# Patient Record
Sex: MALE | Race: WHITE
[De-identification: names, ages, dates, MRNs, and addresses within clinical notes are randomized per-mention and may not be internally consistent; named-entity substitution may affect disease eponyms.]

---

## 2019-03-15 ENCOUNTER — HOSPITAL ENCOUNTER (EMERGENCY)
Dept: HOSPITAL 84 - D.ER | Age: 79
Discharge: HOME | End: 2019-03-15
Payer: MEDICARE

## 2019-03-15 VITALS
BODY MASS INDEX: 24.97 KG/M2 | WEIGHT: 188.4 LBS | HEIGHT: 73 IN | HEIGHT: 73 IN | WEIGHT: 188.4 LBS | BODY MASS INDEX: 24.97 KG/M2

## 2019-03-15 VITALS — SYSTOLIC BLOOD PRESSURE: 135 MMHG | DIASTOLIC BLOOD PRESSURE: 72 MMHG

## 2019-03-15 DIAGNOSIS — B02.9: Primary | ICD-10-CM

## 2019-03-15 DIAGNOSIS — B02.8: ICD-10-CM

## 2019-10-09 ENCOUNTER — HOSPITAL ENCOUNTER (INPATIENT)
Dept: HOSPITAL 84 - D.ER | Age: 79
LOS: 1 days | Discharge: HOME | DRG: 392 | End: 2019-10-10
Attending: INTERNAL MEDICINE | Admitting: INTERNAL MEDICINE
Payer: MEDICARE

## 2019-10-09 VITALS — DIASTOLIC BLOOD PRESSURE: 74 MMHG | SYSTOLIC BLOOD PRESSURE: 126 MMHG

## 2019-10-09 VITALS
BODY MASS INDEX: 26.44 KG/M2 | HEIGHT: 73 IN | BODY MASS INDEX: 26.44 KG/M2 | WEIGHT: 199.52 LBS | BODY MASS INDEX: 26.44 KG/M2 | WEIGHT: 199.52 LBS | HEIGHT: 73 IN

## 2019-10-09 VITALS — DIASTOLIC BLOOD PRESSURE: 97 MMHG | SYSTOLIC BLOOD PRESSURE: 171 MMHG

## 2019-10-09 VITALS — SYSTOLIC BLOOD PRESSURE: 150 MMHG | DIASTOLIC BLOOD PRESSURE: 72 MMHG

## 2019-10-09 VITALS — DIASTOLIC BLOOD PRESSURE: 79 MMHG | SYSTOLIC BLOOD PRESSURE: 166 MMHG

## 2019-10-09 VITALS — SYSTOLIC BLOOD PRESSURE: 124 MMHG | DIASTOLIC BLOOD PRESSURE: 69 MMHG

## 2019-10-09 VITALS — SYSTOLIC BLOOD PRESSURE: 120 MMHG | DIASTOLIC BLOOD PRESSURE: 81 MMHG

## 2019-10-09 VITALS — DIASTOLIC BLOOD PRESSURE: 77 MMHG | SYSTOLIC BLOOD PRESSURE: 132 MMHG

## 2019-10-09 DIAGNOSIS — I10: ICD-10-CM

## 2019-10-09 DIAGNOSIS — K21.0: Primary | ICD-10-CM

## 2019-10-09 DIAGNOSIS — R07.89: ICD-10-CM

## 2019-10-09 DIAGNOSIS — Z86.718: ICD-10-CM

## 2019-10-09 DIAGNOSIS — Z87.891: ICD-10-CM

## 2019-10-09 DIAGNOSIS — Z79.01: ICD-10-CM

## 2019-10-09 DIAGNOSIS — E78.5: ICD-10-CM

## 2019-10-09 DIAGNOSIS — E83.42: ICD-10-CM

## 2019-10-09 DIAGNOSIS — E11.9: ICD-10-CM

## 2019-10-09 DIAGNOSIS — N17.9: ICD-10-CM

## 2019-10-09 DIAGNOSIS — I48.91: ICD-10-CM

## 2019-10-09 LAB
ALBUMIN SERPL-MCNC: 3.5 G/DL (ref 3.4–5)
ALP SERPL-CCNC: 102 U/L (ref 46–116)
ALT SERPL-CCNC: 28 U/L (ref 10–68)
ANION GAP SERPL CALC-SCNC: 9.9 MMOL/L (ref 8–16)
APPEARANCE UR: CLEAR
APTT BLD: 35.6 SECONDS (ref 22.8–39.4)
BASOPHILS NFR BLD AUTO: 0.4 % (ref 0–2)
BILIRUB SERPL-MCNC: 1.46 MG/DL (ref 0.2–1.3)
BILIRUB SERPL-MCNC: NEGATIVE MG/DL
BUN SERPL-MCNC: 22 MG/DL (ref 7–18)
CALCIUM SERPL-MCNC: 8.6 MG/DL (ref 8.5–10.1)
CHLORIDE SERPL-SCNC: 105 MMOL/L (ref 98–107)
CK MB SERPL-MCNC: 2.5 U/L (ref 0–3.6)
CK MB SERPL-MCNC: 3.1 U/L (ref 0–3.6)
CK MB SERPL-MCNC: 3.3 U/L (ref 0–3.6)
CK MB SERPL-MCNC: 3.6 U/L (ref 0–3.6)
CK SERPL-CCNC: 107 UL (ref 21–232)
CK SERPL-CCNC: 112 UL (ref 21–232)
CK SERPL-CCNC: 131 UL (ref 21–232)
CK SERPL-CCNC: 92 UL (ref 21–232)
CO2 SERPL-SCNC: 31.3 MMOL/L (ref 21–32)
COLOR UR: YELLOW
CREAT SERPL-MCNC: 1.4 MG/DL (ref 0.6–1.3)
D DIMER PPP FEU-MCNC: < 0.27 UG/MLFEU (ref 0.2–0.54)
EOSINOPHIL NFR BLD: 3.1 % (ref 0–7)
ERYTHROCYTE [DISTWIDTH] IN BLOOD BY AUTOMATED COUNT: 14.8 % (ref 11.5–14.5)
GLOBULIN SER-MCNC: 3.2 G/L
GLUCOSE SERPL-MCNC: 102 MG/DL (ref 74–106)
GLUCOSE SERPL-MCNC: NEGATIVE MG/DL
HCT VFR BLD CALC: 43.4 % (ref 42–54)
HGB BLD-MCNC: 14.9 G/DL (ref 13.5–17.5)
IMM GRANULOCYTES NFR BLD: 0 % (ref 0–5)
INR PPP: 2.29 (ref 0.85–1.17)
KETONES UR STRIP-MCNC: (no result) MG/DL
LYMPHOCYTES NFR BLD AUTO: 33.8 % (ref 15–50)
MAGNESIUM SERPL-MCNC: 1.7 MG/DL (ref 1.8–2.4)
MCH RBC QN AUTO: 30.7 PG (ref 26–34)
MCHC RBC AUTO-ENTMCNC: 34.3 G/DL (ref 31–37)
MCV RBC: 89.5 FL (ref 80–100)
MONOCYTES NFR BLD: 7.7 % (ref 2–11)
NEUTROPHILS NFR BLD AUTO: 55 % (ref 40–80)
NITRITE UR-MCNC: NEGATIVE MG/ML
OSMOLALITY SERPL CALC.SUM OF ELEC: 285 MOSM/KG (ref 275–300)
PH UR STRIP: 5 [PH] (ref 5–6)
PLATELET # BLD: 144 10X3/UL (ref 130–400)
PMV BLD AUTO: 9.8 FL (ref 7.4–10.4)
POTASSIUM SERPL-SCNC: 4.2 MMOL/L (ref 3.5–5.1)
PROT SERPL-MCNC: 6.7 G/DL (ref 6.4–8.2)
PROT UR-MCNC: NEGATIVE MG/DL
PROTHROMBIN TIME: 24.5 SECONDS (ref 11.6–15)
RBC # BLD AUTO: 4.85 10X6/UL (ref 4.2–6.1)
SODIUM SERPL-SCNC: 142 MMOL/L (ref 136–145)
SP GR UR STRIP: 1.01 (ref 1–1.02)
TROPONIN I SERPL-MCNC: < 0.017 NG/ML (ref 0–0.06)
UROBILINOGEN UR-MCNC: NORMAL MG/DL
WBC # BLD AUTO: 5.1 10X3/UL (ref 4.8–10.8)

## 2019-10-09 NOTE — NUR
PATIENT IS RESTING AND WATCHING TV. FAMILY IS THE ROOM. PATIENT DENIES ANY
PAIN OR CONCERNS AT THIS TIME. BED IN THE LOWEST POSITION AND CALL LIGHT IN
REACH.

## 2019-10-09 NOTE — NUR
RECIEVED REPORT FROM COLUMBA RILEY IN ER. ARRIVED TO FLOOR ON STRETCHER.
TRANSFERED SELF TO BED. ALERT AND ORIENTED. SPOUSE AT BEDSIDE. ANSWERS ALL
QUESTIONS APPROPRIATLY. OPEN AREA TO LEFT FOOT NEAR HEEL. STATED THAT IS AN
EFFECT OF AGENT ORANGE AND [facility] TAK[facility] IT. DENIES ANY CP AT THIS
TIME.

## 2019-10-10 VITALS — DIASTOLIC BLOOD PRESSURE: 68 MMHG | SYSTOLIC BLOOD PRESSURE: 130 MMHG

## 2019-10-10 VITALS — DIASTOLIC BLOOD PRESSURE: 66 MMHG | SYSTOLIC BLOOD PRESSURE: 116 MMHG

## 2019-10-10 VITALS — DIASTOLIC BLOOD PRESSURE: 68 MMHG | SYSTOLIC BLOOD PRESSURE: 125 MMHG

## 2019-10-10 VITALS — DIASTOLIC BLOOD PRESSURE: 80 MMHG | SYSTOLIC BLOOD PRESSURE: 134 MMHG

## 2019-10-10 LAB
ANION GAP SERPL CALC-SCNC: 8.6 MMOL/L (ref 8–16)
BASOPHILS NFR BLD AUTO: 0.2 % (ref 0–2)
BUN SERPL-MCNC: 19 MG/DL (ref 7–18)
CALCIUM SERPL-MCNC: 8.3 MG/DL (ref 8.5–10.1)
CHLORIDE SERPL-SCNC: 107 MMOL/L (ref 98–107)
CO2 SERPL-SCNC: 32.8 MMOL/L (ref 21–32)
CREAT SERPL-MCNC: 1.4 MG/DL (ref 0.6–1.3)
EOSINOPHIL NFR BLD: 3.5 % (ref 0–7)
ERYTHROCYTE [DISTWIDTH] IN BLOOD BY AUTOMATED COUNT: 15.1 % (ref 11.5–14.5)
GLUCOSE SERPL-MCNC: 115 MG/DL (ref 74–106)
HCT VFR BLD CALC: 41.4 % (ref 42–54)
HGB BLD-MCNC: 13.8 G/DL (ref 13.5–17.5)
IMM GRANULOCYTES NFR BLD: 0.2 % (ref 0–5)
INR PPP: 1.85 (ref 0.85–1.17)
LYMPHOCYTES NFR BLD AUTO: 28.1 % (ref 15–50)
MCH RBC QN AUTO: 30 PG (ref 26–34)
MCHC RBC AUTO-ENTMCNC: 33.3 G/DL (ref 31–37)
MCV RBC: 90 FL (ref 80–100)
MONOCYTES NFR BLD: 10.4 % (ref 2–11)
NEUTROPHILS NFR BLD AUTO: 57.6 % (ref 40–80)
OSMOLALITY SERPL CALC.SUM OF ELEC: 289 MOSM/KG (ref 275–300)
PLATELET # BLD: 142 10X3/UL (ref 130–400)
PMV BLD AUTO: 10.2 FL (ref 7.4–10.4)
POTASSIUM SERPL-SCNC: 4.4 MMOL/L (ref 3.5–5.1)
PROTHROMBIN TIME: 20.7 SECONDS (ref 11.6–15)
RBC # BLD AUTO: 4.6 10X6/UL (ref 4.2–6.1)
SODIUM SERPL-SCNC: 144 MMOL/L (ref 136–145)
WBC # BLD AUTO: 4.2 10X3/UL (ref 4.8–10.8)

## 2019-10-10 NOTE — NUR
AWAKE AND ALERT. TELEMERTY SHOWS SR 64. UP AB JAMMIE. SL TO LEFT AC. DENIES ANY
NEEDS. SR UP WITH CALL LIGHT IN REACH. WILL MONITOR

## 2019-10-10 NOTE — NUR
WHEN ASKED IF PATIENT WANTED A FLU SHOT BEFORE DISCHARGE, HE REPLIED, "I WILL
FOLLOW UP WITH [facility] FROM [facility] ON THIS ".

## 2019-10-10 NOTE — MORECARE
CASE MANAGEMENT DISCHARGE SUMMARY
 
 
PATIENT: SKYE ESPARZA                 UNIT: [id number]
ACCOUNT#: [id number]                       ADM DATE: 10/09/19
AGE: 79     : 36  SEX: M            ROOM/BED: D.2390    
AUTHOR: IVETTE,DOC                             PHYSICIAN:                               
 
REFERRING PHYSICIAN: FRANCISCO VALENZUELA MD               
DATE OF SERVICE: 10/10/19
Discharge Plan
 
 
Patient Name: SKYE ESPARZA
Facility: [facility]
Encounter #: [id number]
Medical Record #: [id number]
: 1940
Planned Disposition: Home
Anticipated Discharge Date: 10/10/19
 
Discharge Date: 10/09/2019
Expected LOS: 1
Initial Reviewer: MITCHELL
Initial Review Date: 10/10/2019
Generated: 10/10/19   5:15 pm 
Comments
 
DCP- Discharge Planning
 
Updated by [email or web address]: Bryce Mann on 10/10/19   3:12 pm CT
Patient Name: SKYE ESPARZA                                     
Admission Status: ER   
Accout number: [id number]                              
Admission Date: 10-   
: 1940                                                        
Admission Diagnosis:   
Attending: DELMAR PURVIS                                                
Current LOS:  1   
  
Anticipated DC Date: 10-   
Planned Disposition: Home   
Primary Insurance: MEDICARE A & B   
  
  
Discharge Planning Comments:   
CM MET WITH PT IN ROOM TO DISCUSS DISCHARGE PLANNING AND NEEDS. SKYE ESPARZA provided verbal consent to discuss current and ongoing needs 
with/in the presence of: SPOUSE, JORDIN.  PT REPORTS LIVING AT HOME 
INDEPENDENTLY WITH SPOUSE. PT HAS ROLLING WALKER AND CANE.  [facility]  PT HAS NO OUTSIDE SERVICES ASSISTING IN THE HOME. CM 
DISCUSSED AVAILABILITY OF HOME HEALTH, REHAB SERVICES AND MEDICAL EQUIPMENT. 
PT DENIES DISCHARGE NEEDS, REPORTS HIS WIFE HERE TO PICK HIM UP FOR DISCHARGE 
HOME.   
  
  
Bryce Mann, CASE MANAGEMENT
 DCPIA - Discharge Planning Initial Assessment
 
Updated by [id number]: Bryce Mann on 10/10/19   4:09 pm
*  Is the patient Alert and Oriented?
Yes
*  How many steps to enter\exit or inside your home?
 
*  PCP
DR. FISHER, [facility]
*  [facility]
*  Preadmission Environment
Home with Family
*  ADLs
Independent
*  Equipment
Cane
Rolling Walker
*  Other Equipment
[employer] [facility] [employer]
*  List name and contact numbers for known caregivers / representatives who 
currently or will assist patient after discharge:
JORDIN ESPARZA, SPOUSE, [phone]
*  Verbal permission to speak to the caregivers and representatives has been 
obtained from the patient.
Yes
*  Community resources currently utilized
None
*  Please name any agencies selected above.
NONE
*  Additional services required to return to the preadmission environment?
No
*  Can the patient safely return to the preadmission environment?
Yes
*  Has this patient been hospitalized within the prior 30 days at any 
hospital?
No
 
 
 
 
 
 
Patient Name: SKYE ESPARZA
 
Encounter #: [id number]
Page [id number]
 
 
 
 
 
Electronically Signed by LYNDA STEELE on 10/10/19 at 1615
 
 
 
 
 
 
**All edits/amendments must be made on the electronic document**
 
DICTATION DATE: 10/10/19 1615     : NEELAM  10/10/19 1615     
RPT#: [id number]                                DC DATE:        
                                               STATUS: ADM IN  
[facility]
 [place] [address]
***END OF REPORT***

## 2019-10-14 NOTE — EC
PATIENT:SKYE ESPARZA             DATE OF SERVICE: 10/09/19
SEX: M                                  MEDICAL RECORD: [id number]
DATE OF BIRTH: [birth year]                        LOCATION:D.      D.212
AGE OF PATIENT: 79                             ADMISSION DATE: 10/09/19
 
REFERRING PHYSICIAN:                               
 
INTERPRETING PHYSICIAN: GEOVANNA BENITEZ MD             
 
 
 
                             ECHOCARDIOGRAM REPORT
  ECHO CHARGES 4               ECHO COMPLETE                 Date: 10/09/19
 
 
 
CLINICAL DIAGNOSIS: A-FIB/MURMUR                  
 
                         ECHOCARDIOGRAPHIC MEASUREMENTS
      (adult normal given)
   AC root (d.<3.7cm) 3.1  cm   LV Septum d (<1.2 cm> 1.6  cm
      Valve Excursion 1.2  cm     LV Septum (systole) 2.3  cm
Left Atria (s.<4.0cm> 4.1  cm          LVPW d(<1.2cm) 1.6  cm
        RV (d.<2.3cm) 3.0  cm           LVPW (sytole) 2.2  cm
  LV diastole(<5.6CM) 5.5  cm       MV E-F(>70mm/sec)      cm
           LV systole 3.1  cm           LVOT Diameter 1.9  cm
       MV exc.(>10mm)      cm
Est.ejection fraction (50-75%)     %
 
   DOPPLER:
     LVIT      cm/sec A      cm/sec E 103   cm/sec
       LA      cm/sec      RVSP 25.2 mmHg
     LVOT 90.0 cm/sec   AOP1/2T      m/s
  Asc. Ao 276  cm/sec
     RVOT 60.0 cm/sec
       RA      cm/sec
         cm/sec
 AV Gradient Peak 31.0 mmHg  AV Mean 17.0 mmHg  AV Area 0.8  cm
 MV Gradient Peak 7.6  mmHg  MV Mean 2.6  mmHg  MV Area      cm
   COMMENTS:                                              
 
 
 Cardiac Sonographer: 1               TARUN PATIÑOOE            
      Cardiologist: 1          Dr. Benitez                
             TAPE# PACS           
                                       Pericardial Effusion Y                        
 
 
DATE OF SERVICE:  
 
PROCEDURE:  Echocardiogram.
 
FINDINGS:
1.  Left ventricular chamber size is within normal limits.  Left ventricular
systolic function is normal at 55% to 60%.
2.  Left atrium is enlarged at 4.1 cm.  Right atrium and right ventricular
chamber sizes are within normal limits.
3.  Valvular structures:  Aortic valve demonstrates moderate calcific aortic
 
 
 
ECHOCARDIOGRAM REPORT                          [id number]    SKYE ESPARZA     
 
 
stenosis, valve area calculates to 0.8 cm-squared with gradient of 31 mm across
the valve.  The remaining valvular structures have normal structure and motion.
4.  Doppler interrogation reveals no other valvular insufficiency or stenosis.
5.  No evidence of pericardial effusion or left ventricular thrombus.
 
TRANSINT:[id number] Voice Confirmation ID: [id number] DOCUMENT ID: [id number]
                                           
                                           GEOVANNA BENITEZ MD             
 
 
 
Electronically Signed by GEOVANNA BENITEZ on 10/14/19 at 1110
 
 
 
 
 
 
 
 
 
 
 
 
 
 
 
 
 
 
 
 
 
 
 
 
 
 
 
 
 
 
 
 
 
 
CC:                                                             [id number]
DICTATION DATE: 10/10/19 1350     :     10/10/19 1412      DIS IN  
                                                                      10/10/19
[facility]                                          
[address] [id number]